# Patient Record
Sex: FEMALE | Race: OTHER | HISPANIC OR LATINO
[De-identification: names, ages, dates, MRNs, and addresses within clinical notes are randomized per-mention and may not be internally consistent; named-entity substitution may affect disease eponyms.]

---

## 2017-12-08 PROBLEM — Z00.00 ENCOUNTER FOR PREVENTIVE HEALTH EXAMINATION: Status: ACTIVE | Noted: 2017-12-08

## 2018-01-17 ENCOUNTER — APPOINTMENT (OUTPATIENT)
Dept: OBGYN | Facility: CLINIC | Age: 63
End: 2018-01-17
Payer: COMMERCIAL

## 2018-01-17 VITALS
WEIGHT: 164 LBS | BODY MASS INDEX: 28 KG/M2 | HEIGHT: 64 IN | SYSTOLIC BLOOD PRESSURE: 142 MMHG | DIASTOLIC BLOOD PRESSURE: 92 MMHG

## 2018-01-17 PROCEDURE — 99396 PREV VISIT EST AGE 40-64: CPT

## 2018-01-17 PROCEDURE — 82270 OCCULT BLOOD FECES: CPT

## 2018-01-17 PROCEDURE — 99213 OFFICE O/P EST LOW 20 MIN: CPT | Mod: 25

## 2018-01-19 LAB — HPV HIGH+LOW RISK DNA PNL CVX: NOT DETECTED

## 2018-01-22 LAB — CYTOLOGY CVX/VAG DOC THIN PREP: NORMAL

## 2018-01-24 ENCOUNTER — ASOB RESULT (OUTPATIENT)
Age: 63
End: 2018-01-24

## 2018-01-24 ENCOUNTER — APPOINTMENT (OUTPATIENT)
Dept: OBGYN | Facility: CLINIC | Age: 63
End: 2018-01-24
Payer: COMMERCIAL

## 2018-01-24 PROCEDURE — 76857 US EXAM PELVIC LIMITED: CPT

## 2018-01-24 PROCEDURE — 76830 TRANSVAGINAL US NON-OB: CPT

## 2019-09-10 ENCOUNTER — APPOINTMENT (OUTPATIENT)
Dept: OBGYN | Facility: CLINIC | Age: 64
End: 2019-09-10
Payer: COMMERCIAL

## 2019-09-10 VITALS
SYSTOLIC BLOOD PRESSURE: 158 MMHG | HEIGHT: 64 IN | DIASTOLIC BLOOD PRESSURE: 82 MMHG | BODY MASS INDEX: 28.17 KG/M2 | WEIGHT: 165 LBS | HEART RATE: 71 BPM

## 2019-09-10 DIAGNOSIS — N95.1 MENOPAUSAL AND FEMALE CLIMACTERIC STATES: ICD-10-CM

## 2019-09-10 DIAGNOSIS — Z01.419 ENCOUNTER FOR GYNECOLOGICAL EXAMINATION (GENERAL) (ROUTINE) W/OUT ABNORMAL FINDINGS: ICD-10-CM

## 2019-09-10 DIAGNOSIS — Z87.448 PERSONAL HISTORY OF OTHER DISEASES OF URINARY SYSTEM: ICD-10-CM

## 2019-09-10 DIAGNOSIS — Z87.898 PERSONAL HISTORY OF OTHER SPECIFIED CONDITIONS: ICD-10-CM

## 2019-09-10 DIAGNOSIS — Z87.19 PERSONAL HISTORY OF OTHER DISEASES OF THE DIGESTIVE SYSTEM: ICD-10-CM

## 2019-09-10 DIAGNOSIS — N95.2 POSTMENOPAUSAL ATROPHIC VAGINITIS: ICD-10-CM

## 2019-09-10 DIAGNOSIS — Z82.49 FAMILY HISTORY OF ISCHEMIC HEART DISEASE AND OTHER DISEASES OF THE CIRCULATORY SYSTEM: ICD-10-CM

## 2019-09-10 DIAGNOSIS — Z86.79 PERSONAL HISTORY OF OTHER DISEASES OF THE CIRCULATORY SYSTEM: ICD-10-CM

## 2019-09-10 DIAGNOSIS — Z78.0 ASYMPTOMATIC MENOPAUSAL STATE: ICD-10-CM

## 2019-09-10 DIAGNOSIS — Z87.39 PERSONAL HISTORY OF OTHER DISEASES OF THE MUSCULOSKELETAL SYSTEM AND CONNECTIVE TISSUE: ICD-10-CM

## 2019-09-10 DIAGNOSIS — Z86.39 PERSONAL HISTORY OF OTHER ENDOCRINE, NUTRITIONAL AND METABOLIC DISEASE: ICD-10-CM

## 2019-09-10 DIAGNOSIS — Z12.31 ENCOUNTER FOR SCREENING MAMMOGRAM FOR MALIGNANT NEOPLASM OF BREAST: ICD-10-CM

## 2019-09-10 PROCEDURE — 99396 PREV VISIT EST AGE 40-64: CPT

## 2019-09-10 PROCEDURE — 82270 OCCULT BLOOD FECES: CPT

## 2019-09-10 PROCEDURE — 99213 OFFICE O/P EST LOW 20 MIN: CPT | Mod: 25

## 2019-09-10 RX ORDER — ESTRADIOL 0.1 MG/G
0.1 CREAM VAGINAL
Qty: 1 | Refills: 2 | Status: ACTIVE | COMMUNITY
Start: 2019-09-10 | End: 1900-01-01

## 2019-09-10 RX ORDER — PAROXETINE 7.5 MG/1
7.5 CAPSULE ORAL
Qty: 30 | Refills: 1 | Status: ACTIVE | COMMUNITY
Start: 2019-09-10 | End: 1900-01-01

## 2019-09-10 NOTE — PHYSICAL EXAM
[Awake] : awake [Acute Distress] : no acute distress [Alert] : alert [Mass] : no breast mass [Nipple Discharge] : no nipple discharge [Axillary LAD] : no axillary lymphadenopathy [Tender] : non tender [Soft] : soft [Oriented x3] : oriented to person, place, and time [Atrophy] : atrophy [Normal] : uterus [No Bleeding] : there was no active vaginal bleeding [Uterine Adnexae] : were not tender and not enlarged [Occult Blood] : occult blood test from digital rectal exam was negative [No Tenderness] : no rectal tenderness [CTAB] : CTAB [RRR, No Murmurs] : RRR, no murmurs

## 2019-09-12 LAB — HPV HIGH+LOW RISK DNA PNL CVX: NOT DETECTED

## 2019-09-17 LAB — CYTOLOGY CVX/VAG DOC THIN PREP: ABNORMAL

## 2020-07-18 ENCOUNTER — APPOINTMENT (OUTPATIENT)
Dept: OBGYN | Facility: CLINIC | Age: 65
End: 2020-07-18
Payer: COMMERCIAL

## 2020-07-18 VITALS
DIASTOLIC BLOOD PRESSURE: 89 MMHG | BODY MASS INDEX: 26.46 KG/M2 | SYSTOLIC BLOOD PRESSURE: 137 MMHG | WEIGHT: 155 LBS | HEART RATE: 76 BPM | HEIGHT: 64 IN

## 2020-07-18 DIAGNOSIS — N63.20 UNSPECIFIED LUMP IN THE LEFT BREAST, UNSPECIFIED QUADRANT: ICD-10-CM

## 2020-07-18 DIAGNOSIS — N63.10 UNSPECIFIED LUMP IN THE RIGHT BREAST, UNSPECIFIED QUADRANT: ICD-10-CM

## 2020-07-18 PROCEDURE — 99214 OFFICE O/P EST MOD 30 MIN: CPT

## 2020-07-18 NOTE — PHYSICAL EXAM
[Normal] : normal [Examination Of The Breasts] : a normal appearance [No Discharge] : no discharge [Axillary Lymph Nodes Enlarged Bilaterally] : no enlarged nodes

## 2020-12-21 PROBLEM — Z01.419 ENCOUNTER FOR GYNECOLOGICAL EXAMINATION WITH PAPANICOLAOU SMEAR OF CERVIX: Status: RESOLVED | Noted: 2018-01-17 | Resolved: 2020-12-21

## 2021-04-20 ENCOUNTER — APPOINTMENT (OUTPATIENT)
Dept: OBGYN | Facility: CLINIC | Age: 66
End: 2021-04-20

## 2021-05-14 ENCOUNTER — APPOINTMENT (OUTPATIENT)
Dept: OBGYN | Facility: CLINIC | Age: 66
End: 2021-05-14
Payer: COMMERCIAL

## 2021-05-14 VITALS
SYSTOLIC BLOOD PRESSURE: 125 MMHG | DIASTOLIC BLOOD PRESSURE: 70 MMHG | HEIGHT: 64 IN | BODY MASS INDEX: 26.46 KG/M2 | WEIGHT: 155 LBS

## 2021-05-14 DIAGNOSIS — N64.4 MASTODYNIA: ICD-10-CM

## 2021-05-14 PROCEDURE — 99213 OFFICE O/P EST LOW 20 MIN: CPT

## 2021-05-14 PROCEDURE — 99072 ADDL SUPL MATRL&STAF TM PHE: CPT

## 2021-05-17 PROBLEM — N64.4 BREAST PAIN: Status: ACTIVE | Noted: 2020-03-26

## 2021-05-17 NOTE — HISTORY OF PRESENT ILLNESS
[FreeTextEntry1] : 65-year-old  woman para 2 presents with complaint of right breast discomfort. Patient is status post a fall at home where she hurt herself and the chest as well as other parts of her body. She denies feeling mass. Her last mammogram was BI-RADS category 2 in the April of 2020.

## 2021-05-17 NOTE — DISCUSSION/SUMMARY
[FreeTextEntry1] : I reassured the patient regarding her negative breast exam. Her tenderness is most likely secondary to trauma from her fall. I am obtaining b/l breast mammogram right breast sonogram.

## 2021-05-17 NOTE — PHYSICAL EXAM
[FreeTextEntry6] : no masses, discharge or adenopathy bilaterally; pt with some right breast tenderness

## 2023-02-21 ENCOUNTER — APPOINTMENT (OUTPATIENT)
Dept: OBGYN | Facility: CLINIC | Age: 68
End: 2023-02-21